# Patient Record
Sex: MALE | Race: WHITE | Employment: UNEMPLOYED | URBAN - METROPOLITAN AREA
[De-identification: names, ages, dates, MRNs, and addresses within clinical notes are randomized per-mention and may not be internally consistent; named-entity substitution may affect disease eponyms.]

---

## 2019-03-26 ENCOUNTER — HOSPITAL ENCOUNTER (OUTPATIENT)
Dept: GENERAL RADIOLOGY | Age: 44
Discharge: HOME | End: 2019-03-26
Payer: COMMERCIAL

## 2019-03-26 ENCOUNTER — OFFICE VISIT (OUTPATIENT)
Dept: INTERNAL MEDICINE CLINIC | Age: 44
End: 2019-03-26

## 2019-03-26 VITALS
OXYGEN SATURATION: 100 % | SYSTOLIC BLOOD PRESSURE: 150 MMHG | TEMPERATURE: 98.5 F | RESPIRATION RATE: 19 BRPM | HEART RATE: 69 BPM | HEIGHT: 70 IN | DIASTOLIC BLOOD PRESSURE: 96 MMHG | WEIGHT: 196 LBS | BODY MASS INDEX: 28.06 KG/M2

## 2019-03-26 DIAGNOSIS — Z76.89 ESTABLISHING CARE WITH NEW DOCTOR, ENCOUNTER FOR: Primary | ICD-10-CM

## 2019-03-26 DIAGNOSIS — M79.675 PAIN OF TOE OF LEFT FOOT: ICD-10-CM

## 2019-03-26 DIAGNOSIS — M62.830 SPASM OF BACK MUSCLES: ICD-10-CM

## 2019-03-26 DIAGNOSIS — R56.9 SEIZURE (HCC): ICD-10-CM

## 2019-03-26 DIAGNOSIS — R53.82 CHRONIC FATIGUE: ICD-10-CM

## 2019-03-26 DIAGNOSIS — Z83.49 FH: THYROID DISEASE: ICD-10-CM

## 2019-03-26 DIAGNOSIS — L40.9 PSORIASIS: ICD-10-CM

## 2019-03-26 DIAGNOSIS — F41.1 GAD (GENERALIZED ANXIETY DISORDER): ICD-10-CM

## 2019-03-26 PROCEDURE — 73660 X-RAY EXAM OF TOE(S): CPT

## 2019-03-26 RX ORDER — BACLOFEN 10 MG/1
TABLET ORAL 3 TIMES DAILY
COMMUNITY
End: 2019-03-26 | Stop reason: SDUPTHER

## 2019-03-26 RX ORDER — CLONAZEPAM 1 MG/1
TABLET ORAL
Qty: 30 TAB | Refills: 0 | Status: SHIPPED | OUTPATIENT
Start: 2019-04-26 | End: 2019-03-26 | Stop reason: SDUPTHER

## 2019-03-26 RX ORDER — CYCLOBENZAPRINE HCL 10 MG
TABLET ORAL
COMMUNITY
End: 2019-03-26

## 2019-03-26 RX ORDER — CLONAZEPAM 1 MG/1
TABLET ORAL
Qty: 30 TAB | Refills: 0 | Status: SHIPPED | OUTPATIENT
Start: 2019-05-26

## 2019-03-26 RX ORDER — CLONAZEPAM 1 MG/1
TABLET ORAL
Refills: 0 | COMMUNITY
Start: 2019-01-13 | End: 2019-03-26 | Stop reason: SDUPTHER

## 2019-03-26 RX ORDER — BISMUTH SUBSALICYLATE 262 MG
1 TABLET,CHEWABLE ORAL DAILY
COMMUNITY

## 2019-03-26 RX ORDER — CLOBETASOL PROPIONATE 0.05 G/100ML
SHAMPOO TOPICAL
COMMUNITY

## 2019-03-26 RX ORDER — CLONAZEPAM 1 MG/1
TABLET ORAL
Qty: 30 TAB | Refills: 0 | Status: SHIPPED | OUTPATIENT
Start: 2019-03-26 | End: 2019-03-26 | Stop reason: SDUPTHER

## 2019-03-26 RX ORDER — BACLOFEN 10 MG/1
10 TABLET ORAL
Qty: 30 TAB | Refills: 1 | Status: SHIPPED | OUTPATIENT
Start: 2019-03-26

## 2019-03-26 RX ORDER — TRIAMCINOLONE ACETONIDE 1 MG/G
CREAM TOPICAL 2 TIMES DAILY
Qty: 45 G | Refills: 3 | Status: SHIPPED | OUTPATIENT
Start: 2019-03-26

## 2019-03-26 NOTE — PROGRESS NOTES
Gini Chan is a 53 y.o. male and presents with Establish Care James Aguirre Subjective: 
 
First visit. Establish care Pt w  Multiple concerns: 
1) psoriatic flares and left 4th toe pain 2)Fatigue and feeling cold chronically. Concerned bc his mother has thyroid dz 3)abn mole rt temple 4)IBS-as per pt, flares are relieved w prilosec 5)Refill of his clonazepam-used to tx anxiety/seizure d/o (unclear etiology) and tics PMH-childhood asthma 
 -h/o kidney stones 
 -psoriasis 
 -arthritis -ROB 
 -seizure d/o 
 -insomnia 
 -chronic lbp PSH-lithotripsy Lumbar disc surgery SH- 
 -works as artist and  
 No tobacco/etoh/illicit drugs FH father  [de-identified] pancreatic ca Mother alive 68 thyroid disorder 2 half brothers healthy HM Colonoscopy 2-3 yrs ago next due @ [address] yo Immunizations ~ 7 yrs ago Eye care < 1 yr ago Dental care UTD Exercise-walks reg w dog Labs ~ 1 year ago Review of Systems Review of systems (12) negative, except noted above. Past Medical History:  
Diagnosis Date  Asthma  Kidney stones Past Surgical History:  
Procedure Laterality Date  HX ORTHOPAEDIC Social History Socioeconomic History  Marital status:  Spouse name: Not on file  Number of children: Not on file  Years of education: Not on file  Highest education level: Not on file Tobacco Use  Smoking status: Former Smoker Last attempt to quit: 3/26/2016 Years since quitting: 3.0  Smokeless tobacco: Current User Substance and Sexual Activity  Alcohol use: Not Currently Frequency: Never  Drug use: Never  Sexual activity: Yes  
  Partners: Female Family History Problem Relation Age of Onset  Cancer Father  Dementia Maternal Grandmother  Arthritis-osteo Maternal Grandmother  Heart Disease Maternal Grandfather Current Outpatient Medications Medication Sig Dispense Refill  multivitamin (ONE A DAY) tablet Take 1 Tab by mouth daily.  clobetasol (CLOBEX) 0.05 % sham by Apply Externally route.  baclofen (LIORESAL) 10 mg tablet Take 1 Tab by mouth three (3) times daily as needed for Pain. 30 Tab 1  
 triamcinolone acetonide (KENALOG) 0.1 % topical cream Apply  to affected area two (2) times a day. use thin layer 45 g 3  
 [START ON 5/26/2019] clonazePAM (KLONOPIN) 1 mg tablet TAKE 1 TABLET BY MOUTH AT BEDTIME 30 Tab 0 Allergies Allergen Reactions  Bactrim [Sulfamethoprim] Hives Objective: 
Visit Vitals BP (!) 150/96 (BP 1 Location: Left arm, BP Patient Position: Sitting) Pulse 69 Temp 98.5 °F (36.9 °C) (Oral) Resp 19 Ht 5' 9.5\" (1.765 m) Wt 196 lb (88.9 kg) SpO2 100% BMI 28.53 kg/m² Physical Exam:  
General appearance - alert, well appearing, and in no distress Mental status - alert, oriented to person, place, and time EYE-LUBA, EOMI, corneas normal, no foreign bodies ENT-ENT exam normal, no neck nodes or sinus tenderness Mouth - mucous membranes moist, pharynx normal without lesions Neck - supple, no significant adenopathy Chest - clear to auscultation, no wheezes, rales or rhonchi, symmetric air entry Heart - normal rate, regular rhythm, normal S1, S2, Abdomen - soft, nontender, nondistended, no masses or organomegaly Ext-peripheral pulses normal, no pedal edema, no clubbing or cyanosis Skin-Warm and dry. Multiple nevi, fair skinned small, dry, flaky skin rt temple w clear borders Neuro -alert, oriented, normal speech, no focal findings or movement disorder noted No results found for this or any previous visit. Assessment/Plan: ICD-10-CM ICD-9-CM 1. Establishing care with new doctor, encounter for Z76.89 V65.8 2. Spasm of back muscles M62.830 724.8 baclofen (LIORESAL) 10 mg tablet 3.  Psoriasis L40.9 696.1 triamcinolone acetonide (KENALOG) 0.1 % topical cream  
   XR 4TH TOE RT MIN 2 V  
 REFERRAL TO DERMATOLOGY 4. Pain of toe of left foot M79.675 729.5 XR 4TH TOE RT MIN 2 V  
   REFERRAL TO DERMATOLOGY 5. Chronic fatigue R53.82 780.79 TSH REFLEX TO T4  
6. FH: thyroid disease Z83.49 V18.19 TSH REFLEX TO T4  
7. ROB (generalized anxiety disorder) F41.1 300.02 clonazePAM (KLONOPIN) 1 mg tablet DISCONTINUED: clonazePAM (KLONOPIN) 1 mg tablet DISCONTINUED: clonazePAM (KLONOPIN) 1 mg tablet 8. Seizure ([address] R56.9 780.39 clonazePAM (KLONOPIN) 1 mg tablet DISCONTINUED: clonazePAM (KLONOPIN) 1 mg tablet DISCONTINUED: clonazePAM (KLONOPIN) 1 mg tablet Orders Placed This Encounter  XR 4TH TOE RT MIN 2 V Standing Status:   Future Number of Occurrences:   1 Standing Expiration Date:   5/26/2019 Order Specific Question:   Reason for Exam  
  Answer:   pmh psoriasis w toe pain and swelling  TSH REFLEX TO T4  
 REFERRAL TO DERMATOLOGY Referral Priority:   Routine Referral Type:   Consultation Referral Reason:   Specialty Services Required Referred to Provider:   Elmo Lemus,   
  Number of Visits Requested:   1  DISCONTD: clonazePAM (KLONOPIN) 1 mg tablet Sig: TAKE 1 TABLET BY MOUTH AT BEDTIME Refill:  0  
 DISCONTD: baclofen (LIORESAL) 10 mg tablet Sig: Take  by mouth three (3) times daily.  multivitamin (ONE A DAY) tablet Sig: Take 1 Tab by mouth daily.  DISCONTD: cyclobenzaprine (FLEXERIL) 10 mg tablet Sig: Take  by mouth three (3) times daily as needed for Muscle Spasm(s).  clobetasol (CLOBEX) 0.05 % sham  
  Sig: by Apply Externally route.  baclofen (LIORESAL) 10 mg tablet Sig: Take 1 Tab by mouth three (3) times daily as needed for Pain. Dispense:  30 Tab Refill:  1  
 triamcinolone acetonide (KENALOG) 0.1 % topical cream  
  Sig: Apply  to affected area two (2) times a day. use thin layer Dispense:  45 g Refill:  3  
 DISCONTD: clonazePAM (KLONOPIN) 1 mg tablet Sig: TAKE 1 TABLET BY MOUTH AT BEDTIME Dispense:  30 Tab Refill:  0  
 DISCONTD: clonazePAM (KLONOPIN) 1 mg tablet Sig: TAKE 1 TABLET BY MOUTH AT BEDTIME Dispense:  30 Tab Refill:  0  
 clonazePAM (KLONOPIN) 1 mg tablet Sig: TAKE 1 TABLET BY MOUTH AT BEDTIME Dispense:  30 Tab Refill:  0  
1. Establishing care with new doctor, encounter for Completed 2. Spasm of back muscles 
 
- baclofen (LIORESAL) 10 mg tablet; Take 1 Tab by mouth three (3) times daily as needed for Pain. Dispense: 30 Tab; Refill: 1 3. Psoriasis Refer to derm 
- triamcinolone acetonide (KENALOG) 0.1 % topical cream; Apply  to affected area two (2) times a day. use thin layer  Dispense: 45 g; Refill: 3 
- XR 4TH TOE RT MIN 2 V; Future 
- REFERRAL TO DERMATOLOGY 4. Pain of toe of left foot - XR 4TH TOE RT MIN 2 V; Future 
- REFERRAL TO DERMATOLOGY 5. Chronic fatigue 
 
- TSH REFLEX TO T4 
 
6. FH: thyroid disease 
 
- TSH REFLEX TO T4 
 
7. ROB (generalized anxiety disorder) Pts  checked, there is no evidence of misuse or abuse. Pts urine tox will be ordered NV 
 
- clonazePAM (KLONOPIN) 1 mg tablet; TAKE 1 TABLET BY MOUTH AT BEDTIME  Dispense: 30 Tab; Refill: 0 
 
8. Seizure ([address] - clonazePAM (KLONOPIN) 1 mg tablet; TAKE 1 TABLET BY MOUTH AT BEDTIME  Dispense: 30 Tab; Refill: 0 Patient Instructions Anxiety Disorder: Care Instructions Your Care Instructions Anxiety is a normal reaction to stress. Difficult situations can cause you to have symptoms such as sweaty palms and a nervous feeling. In an anxiety disorder, the symptoms are far more severe. Constant worry, muscle tension, trouble sleeping, nausea and diarrhea, and other symptoms can make normal daily activities difficult or impossible. These symptoms may occur for no reason, and they can affect your work, school, or social life. Medicines, counseling, and self-care can all help. Follow-up care is a key part of your treatment and safety. Be sure to make and go to all appointments, and call your doctor if you are having problems. It's also a good idea to know your test results and keep a list of the medicines you take. How can you care for yourself at home? · Take medicines exactly as directed. Call your doctor if you think you are having a problem with your medicine. · Go to your counseling sessions and follow-up appointments. · Recognize and accept your anxiety. Then, when you are in a situation that makes you anxious, say to yourself, \"This is not an emergency. I feel uncomfortable, but I am not in danger. I can keep going even if I feel anxious. \" · Be kind to your body: 
? Relieve tension with exercise or a massage. ? Get enough rest. 
? Avoid alcohol, caffeine, nicotine, and illegal drugs. They can increase your anxiety level and cause sleep problems. ? Learn and do relaxation techniques. See below for more about these techniques. · Engage your mind. Get out and do something you enjoy. Go to a [employer] movie, or take a walk or hike. Plan your day. Having too much or too little to do can make you anxious. · Keep a record of your symptoms. Discuss your fears with a good friend or family member, or join a support group for people with similar problems. Talking to others sometimes relieves stress. · Get involved in social groups, or volunteer to help others. Being alone sometimes makes things seem worse than they are. · Get at least 30 minutes of exercise on most days of the week to relieve stress. Walking is a good choice. You also may want to do other activities, such as running, swimming, cycling, or playing tennis or team sports. Relaxation techniques Do relaxation exercises 10 to 20 minutes a day. You can play soothing, relaxing music while you do them, if you wish. · Tell others in your house that you are going to do your relaxation exercises. Ask them not to disturb you. · Find a comfortable place, away from all distractions and noise. · Lie down on your back, or sit with your back straight. · Focus on your breathing. Make it slow and steady. · Breathe in through your nose. Breathe out through either your nose or mouth. · Breathe deeply, filling up the area between your navel and your rib cage. Breathe so that your belly goes up and down. · Do not hold your breath. · Breathe like this for 5 to 10 minutes. Notice the feeling of calmness throughout your whole body. As you continue to breathe slowly and deeply, relax by doing the following for another 5 to 10 minutes: · Tighten and relax each muscle group in your body. You can begin at your toes and work your way up to your head. · Imagine your muscle groups relaxing and becoming heavy. · Empty your mind of all thoughts. · Let yourself relax more and more deeply. · Become aware of the state of calmness that surrounds you. · When your relaxation time is over, you can bring yourself back to alertness by moving your fingers and toes and then your hands and feet and then stretching and moving your entire body. Sometimes people fall asleep during relaxation, but they usually wake up shortly afterward. · Always give yourself time to return to full alertness before you drive a car or do anything that might cause an accident if you are not fully alert. Never play a relaxation tape while you drive a car. When should you call for help? Call 911 anytime you think you may need emergency care. For example, call if: 
  · You feel you cannot stop from hurting yourself or someone else.  
Richard Soria the numbers for these national suicide hotlines: 0-243-549-TALK (2-633.287.7490) and 8-558-OESZRHI (1-825.351.2552). If you or someone you know talks about suicide or feeling hopeless, get help right away. 
 Watch closely for changes in your health, and be sure to contact your doctor if: 
  · You have anxiety or fear that affects your life.   · You have symptoms of anxiety that are new or different from those you had before. Where can you learn more? Go to http://shady-seb.info/. Enter P754 in the search box to learn more about \"Anxiety Disorder: Care Instructions. \" Current as of: September 11, 2018 Content Version: 11.9 © 3295-6513 [employer]. Care instructions adapted under license by [employer] (which disclaims liability or warranty for this information). If you have questions about a medical condition or this instruction, always ask your healthcare professional. [place] [address] any warranty or liability for your use of this information. Follow-up and Dispositions · Return in about 1 month (around 4/23/2019) for bp check w nurse and 3 mths w me. I have reviewed with the patient details of the assessment and plan and all questions were answered. Relevent patient education was performed. The most recent lab findings were reviewed with the patient. An After Visit Summary was printed and given to the patient.

## 2019-03-26 NOTE — PATIENT INSTRUCTIONS

## 2019-03-26 NOTE — PROGRESS NOTES
Chief Complaint Patient presents with [address] [place] [address] 1. Have you been to the ER, urgent care clinic since your last visit? Hospitalized since your last visit? No 
 
2. Have you seen or consulted any other health care providers outside of the [address] [place] [address] since your last visit? Include any pap smears or colon screening.  No

## 2019-03-27 LAB — TSH SERPL DL<=0.005 MIU/L-ACNC: 1.77 UIU/ML (ref 0.45–4.5)

## 2019-04-08 ENCOUNTER — TELEPHONE (OUTPATIENT)
Dept: INTERNAL MEDICINE CLINIC | Age: 44
End: 2019-04-08

## 2019-04-08 NOTE — TELEPHONE ENCOUNTER
Pt states he attempted tp schedule appt with [facility] and the earliest appt is in December, 2019. They told him he could get an earlier appt if  You sent ov notes and a reason he needs an early appt. Pt states some days he can barely walk and reason is immobility and his foot is in  An incredible amount of pain. Pt # [phone] and pt wants a return call when tis is done please.   Fax for [facility] dermatology

## 2019-06-25 ENCOUNTER — OFFICE VISIT (OUTPATIENT)
Dept: INTERNAL MEDICINE CLINIC | Age: 44
End: 2019-06-25

## 2019-06-25 VITALS
OXYGEN SATURATION: 98 % | BODY MASS INDEX: 27.77 KG/M2 | RESPIRATION RATE: 19 BRPM | WEIGHT: 194 LBS | DIASTOLIC BLOOD PRESSURE: 99 MMHG | HEART RATE: 88 BPM | SYSTOLIC BLOOD PRESSURE: 140 MMHG | HEIGHT: 70 IN | TEMPERATURE: 98.2 F

## 2019-06-25 DIAGNOSIS — F41.1 GAD (GENERALIZED ANXIETY DISORDER): Primary | ICD-10-CM

## 2019-06-25 DIAGNOSIS — R56.9 SEIZURE (HCC): ICD-10-CM

## 2019-06-25 DIAGNOSIS — R21 SKIN RASH: ICD-10-CM

## 2019-06-25 DIAGNOSIS — K58.9 IRRITABLE BOWEL SYNDROME WITHOUT DIARRHEA: ICD-10-CM

## 2019-06-25 DIAGNOSIS — Z79.899 CHRONIC PRESCRIPTION BENZODIAZEPINE USE: ICD-10-CM

## 2019-06-25 DIAGNOSIS — R53.82 CHRONIC FATIGUE: ICD-10-CM

## 2019-06-25 NOTE — PROGRESS NOTES
Chief Complaint   Patient presents with    Hypertension     1. Have you been to the ER, urgent care clinic since your last visit? Hospitalized since your last visit? No    2. Have you seen or consulted any other health care providers outside of the [address] [place] [address] since your last visit? Include any pap smears or colon screening.  No

## 2019-06-25 NOTE — PATIENT INSTRUCTIONS
Celiac Disease: Care Instructions Your Care Instructions Celiac disease (or celiac sprue) is a problem with digesting gluten. Gluten is a type of protein found in wheat, rye, and other grains. This problem starts when the body's immune system attacks the small intestine when gluten is eaten. The immune system is supposed to fight off viruses and other invaders, but sometimes it turns on the person's own body. (This is called an autoimmune disease.) Celiac disease seems to run in families. Celiac disease causes damage to the small intestine. This makes it hard for the body to absorb vitamins and other nutrients. You cannot prevent celiac disease. But you can stop and reverse the damage to the small intestine by eating a strict gluten-free diet. Follow-up care is a key part of your treatment and safety. Be sure to make and go to all appointments, and call your doctor if you are having problems. It's also a good idea to know your test results and keep a list of the medicines you take. How can you care for yourself at home? · Eat a gluten-free diet to prevent symptoms and damage to the small intestine. Even a small amount of gluten may cause damage. ? Avoid all foods that contain wheat, rye, and barley gluten. Bread, bagels, pasta, pizza, malted breakfast cereals, and crackers are all examples of foods that contain gluten. ? Avoid oats, at least at first. Oats may cause symptoms in some people. The oats may be contaminated with wheat, barley, or rye from processing. But many people who have celiac disease can eat moderate amounts of oats without having symptoms. Health professionals vary in their long-term recommendations regarding eating foods with oats. But most agree it is safe to eat oats labeled as gluten-free. · You may need to avoid milk and milk products for a while. Once you stop eating any gluten, the intestine will begin to heal. Then it should be okay to drink milk and eat milk products. · Read food labels carefully and look for hidden gluten, such as gluten in medicine and some food additives. If a label says \"modified food starch,\" the product may contain gluten. · Plan your diet around: 
? Eggs. ? Dairy products, if you can eat them. Cheese, yogurt, and other dairy products can be an important part of the diet. ? Flours and foods made with amaranth, arrowroot, beans, buckwheat, corn, cornmeal, flax, millet, potatoes, gluten-free nut and oat bran, quinoa, rice, sorghum, soybeans, tapioca, or teff. ? Fresh, frozen, and canned meats, fruits, and vegetables. Watch for added gluten. · Talk to your doctor or contact your local hospital or dietitian for information about support groups in your area. You may find a support group helpful for discovering ways to help you deal with celiac disease. Celiac disease support groups often share recipes and good food sources. · Look for gluten-free foods. Many food stores, especially health food stores, offer specially marked gluten-free food. When should you call for help? Watch closely for changes in your health, and be sure to contact your doctor if: 
  · Your bloating, gas, and diarrhea get worse.  
  · You have bloating, gas, and diarrhea after not having them for a while. Where can you learn more? Go to http://shady-seb.info/. Enter [id number] in the search box to learn more about \"Celiac Disease: Care Instructions. \" Current as of: March 27, 2018 Content Version: 11.9 © 2554-6142 [employer]. Care instructions adapted under license by [employer] (which disclaims liability or warranty for this information). If you have questions about a medical condition or this instruction, always ask your healthcare professional. [address] any warranty or liability for your use of this information.

## 2019-06-25 NOTE — PROGRESS NOTES
Sara Alexander is a 53 y.o. male and presents with Hypertension  . Subjective:    Pt comes-in to f/u last visit    Toe pain and swelling has resolved  Pt refuses bp medication as his bps are mostly nml at home   Pt is concerned re:celiac sprue due to the constellation of sxs he has;chronic fatigue, intermit abd pain, mysterious rashes. .    Pt w  Multiple concerns:  1) psoriatic flares and left 4th toe pain  2)Fatigue and feeling cold chronically. Concerned bc his mother has thyroid dz  3)abn mole rt temple  4)IBS-as per pt, flares are relieved w prilosec  5)Refill of his clonazepam-used to tx anxiety/seizure d/o (unclear etiology) and tics    PMH-childhood asthma   -h/o kidney stones   -psoriasis   -arthritis   -ROB   -seizure d/o   -insomnia   -chronic lbp            Review of Systems  Review of systems (12) negative, except noted above. Past Medical History:   Diagnosis Date    Asthma     Kidney stones      Past Surgical History:   Procedure Laterality Date    HX ORTHOPAEDIC       Social History     Socioeconomic History    Marital status:      Spouse name: Not on file    Number of children: Not on file    Years of education: Not on file    Highest education level: Not on file   Tobacco Use    Smoking status: Former Smoker     Last attempt to quit: 3/26/2016     Years since quitting: 3.2    Smokeless tobacco: Current User   Substance and Sexual Activity    Alcohol use: Not Currently     Frequency: Never    Drug use: Never    Sexual activity: Yes     Partners: Female     Family History   Problem Relation Age of Onset    Cancer Father     Dementia Maternal Grandmother     Arthritis-osteo Maternal Grandmother     Heart Disease Maternal Grandfather      Current Outpatient Medications   Medication Sig Dispense Refill    multivitamin (ONE A DAY) tablet Take 1 Tab by mouth daily.  baclofen (LIORESAL) 10 mg tablet Take 1 Tab by mouth three (3) times daily as needed for Pain.  30 Tab 1    clonazePAM (KLONOPIN) 1 mg tablet TAKE 1 TABLET BY MOUTH AT BEDTIME 30 Tab 0    clobetasol (CLOBEX) 0.05 % sham by Apply Externally route.  triamcinolone acetonide (KENALOG) 0.1 % topical cream Apply  to affected area two (2) times a day. use thin layer 45 g 3     Allergies   Allergen Reactions    Bactrim [Sulfamethoprim] Hives       Objective:  Visit Vitals  BP (!) 140/99 (BP 1 Location: Right arm, BP Patient Position: Sitting)   Pulse 88   Temp 98.2 °F (36.8 °C) (Oral)   Resp 19   Ht 5' 9.5\" (1.765 m)   Wt 194 lb (88 kg)   SpO2 98%   BMI 28.24 kg/m²     Physical Exam:   General appearance - alert, well appearing, anxious. Odd affect  Mental status - alert, oriented to person, place, and time  EYE-EOMI  Neck - supple, no significant adenopathy   Chest - clear to auscultation, no wheezes, rales or rhonchi, symmetric air entry   Heart - normal rate, regular rhythm, normal S1, S2,  Abdomen - soft, nontender, nondistended, no masses or organomegaly  Ext-peripheral pulses normal, no pedal edema, no clubbing or cyanosis  Neuro -alert, oriented, normal speech, no focal findings or movement disorder noted        Results for orders placed or performed in visit on 03/26/19   TSH REFLEX TO T4   Result Value Ref Range    TSH 1.770 0.450 - 4.500 uIU/mL       Assessment/Plan:    ICD-10-CM ICD-9-CM    1. ROB (generalized anxiety disorder) F41.1 300.02 COMPLIANCE DRUG SCREEN/PRESCRIPTION MONITORING   2. Seizure ([address] R56.9 780.39 COMPLIANCE DRUG SCREEN/PRESCRIPTION MONITORING   3. Chronic prescription benzodiazepine use Z79.899 V58.69 COMPLIANCE DRUG SCREEN/PRESCRIPTION MONITORING   4. Irritable bowel syndrome without diarrhea K58.9 564.1 REFERRAL TO GASTROENTEROLOGY      CELIAC ANTIBODY PROFILE   5. Skin rash R21 782.1 CELIAC ANTIBODY PROFILE      LYME AB/WESTERN BLOT REFLEX   6.  Chronic fatigue R53.82 780.79 CELIAC ANTIBODY PROFILE      LYME AB/WESTERN BLOT REFLEX     Orders Placed This Encounter    COMPLIANCE DRUG SCREEN/PRESCRIPTION MONITORING    CELIAC ANTIBODY PROFILE    LYME AB/WESTERN BLOT REFLEX (Do not use for [place])   [address] [place] [address]     Referral Priority:   Routine     Referral Type:   Consultation     Referral Reason:   Specialty Services Required     Referral Location:   [facility]     Referred to Provider: Corie Perry MD     Requested Specialty:   Gastroenterology     Number of Visits Requested:   1   1. ROB (generalized anxiety disorder)    - COMPLIANCE DRUG SCREEN/PRESCRIPTION MONITORING    2. Seizure ([address]    - COMPLIANCE DRUG SCREEN/PRESCRIPTION MONITORING    3. Chronic prescription benzodiazepine use    - COMPLIANCE DRUG SCREEN/PRESCRIPTION MONITORING    4. Irritable bowel syndrome without diarrhea    - REFERRAL TO GASTROENTEROLOGY  - CELIAC ANTIBODY PROFILE    5. Skin rash    - CELIAC ANTIBODY PROFILE  - LYME AB/WESTERN BLOT REFLEX    6. Chronic fatigue    - CELIAC ANTIBODY PROFILE  - LYME AB/WESTERN BLOT REFLEX      Patient Instructions          Celiac Disease: Care Instructions  Your Care Instructions  Celiac disease (or celiac sprue) is a problem with digesting gluten. Gluten is a type of protein found in wheat, rye, and other grains. This problem starts when the body's immune system attacks the small intestine when gluten is eaten. The immune system is supposed to fight off viruses and other invaders, but sometimes it turns on the person's own body. (This is called an autoimmune disease.) Celiac disease seems to run in families. Celiac disease causes damage to the small intestine. This makes it hard for the body to absorb vitamins and other nutrients. You cannot prevent celiac disease. But you can stop and reverse the damage to the small intestine by eating a strict gluten-free diet. Follow-up care is a key part of your treatment and safety. Be sure to make and go to all appointments, and call your doctor if you are having problems.  It's also a good idea to know your test results and keep a list of the medicines you take. How can you care for yourself at home? · Eat a gluten-free diet to prevent symptoms and damage to the small intestine. Even a small amount of gluten may cause damage. ? Avoid all foods that contain wheat, rye, and barley gluten. Bread, bagels, pasta, pizza, malted breakfast cereals, and crackers are all examples of foods that contain gluten. ? Avoid oats, at least at first. Oats may cause symptoms in some people. The oats may be contaminated with wheat, barley, or rye from processing. But many people who have celiac disease can eat moderate amounts of oats without having symptoms. Health professionals vary in their long-term recommendations regarding eating foods with oats. But most agree it is safe to eat oats labeled as gluten-free. · You may need to avoid milk and milk products for a while. Once you stop eating any gluten, the intestine will begin to heal. Then it should be okay to drink milk and eat milk products. · Read food labels carefully and look for hidden gluten, such as gluten in medicine and some food additives. If a label says \"modified food starch,\" the product may contain gluten. · Plan your diet around:  ? Eggs. ? Dairy products, if you can eat them. Cheese, yogurt, and other dairy products can be an important part of the diet. ? Flours and foods made with amaranth, arrowroot, beans, buckwheat, corn, cornmeal, flax, millet, potatoes, gluten-free nut and oat bran, quinoa, rice, sorghum, soybeans, tapioca, or teff. ? Fresh, frozen, and canned meats, fruits, and vegetables. Watch for added gluten. · Talk to your doctor or contact your local hospital or dietitian for information about support groups in your area. You may find a support group helpful for discovering ways to help you deal with celiac disease. Celiac disease support groups often share recipes and good food sources. · Look for gluten-free foods.  Many food stores, especially health food stores, offer specially marked gluten-free food. When should you call for help? Watch closely for changes in your health, and be sure to contact your doctor if:    · Your bloating, gas, and diarrhea get worse.     · You have bloating, gas, and diarrhea after not having them for a while. Where can you learn more? Go to http://shady-seb.info/. Enter [id number] in the search box to learn more about \"Celiac Disease: Care Instructions. \"  Current as of: March 27, 2018  Content Version: 11.9  © 7388-4184 [employer]. Care instructions adapted under license by [employer] (which disclaims liability or warranty for this information). If you have questions about a medical condition or this instruction, always ask your healthcare professional. [address] any warranty or liability for your use of this information. Follow-up and Dispositions    · Return in about 6 months (around 12/25/2019) for bp f/u. I have reviewed with the patient details of the assessment and plan and all questions were answered. Relevent patient education was performed. The most recent lab findings were reviewed with the patient. An After Visit Summary was printed and given to the patient.

## 2019-06-30 LAB — DRUGS UR: NORMAL

## 2019-07-08 PROBLEM — F12.90 MARIJUANA USE: Status: ACTIVE | Noted: 2019-07-08
